# Patient Record
Sex: MALE | Race: WHITE | NOT HISPANIC OR LATINO | Employment: FULL TIME | URBAN - METROPOLITAN AREA
[De-identification: names, ages, dates, MRNs, and addresses within clinical notes are randomized per-mention and may not be internally consistent; named-entity substitution may affect disease eponyms.]

---

## 2023-04-03 ENCOUNTER — EVALUATION (OUTPATIENT)
Dept: PHYSICAL THERAPY | Facility: REHABILITATION | Age: 38
End: 2023-04-03

## 2023-04-03 DIAGNOSIS — G89.29 CHRONIC RIGHT-SIDED THORACIC BACK PAIN: Primary | ICD-10-CM

## 2023-04-03 DIAGNOSIS — M54.6 CHRONIC RIGHT-SIDED THORACIC BACK PAIN: Primary | ICD-10-CM

## 2023-04-03 NOTE — LETTER
April 3, 2023    JAYLEN Cantor  [id number] [facility] [address]    Patient: Margo Hendricks   YOB: 1985   Date of Visit: 4/3/2023     Encounter Diagnosis     ICD-10-CM    1  Chronic right-sided thoracic back pain  M54 6     G89 29           Dear Dr Kota Alexander: Thank you for your recent referral of Margo Hendricks  Please review the attached evaluation summary from Damian's recent visit  Please verify that you agree with the plan of care by signing the attached order  If you have any questions or concerns, please do not hesitate to call  I sincerely appreciate the opportunity to share in the care of one of your patients and hope to have another opportunity to work with you in the near future  Sincerely,    Etelvina Price, PT      Referring Provider:      I certify that I have read the below Plan of Care and certify the need for these services furnished under this plan of treatment while under my care  JAYLEN Cantor  [address] [place] [id number]  Via Fax: [phone]          PT Evaluation     Today's date: 4/3/2023  Patient name: Margo Hendricks  : 1985  MRN: [id number]  Referring provider: NICOLLE Tidwell  Dx:   Encounter Diagnosis     ICD-10-CM    1  Chronic right-sided thoracic back pain  M54 6     G89 29                    Assessment  Assessment details: Pt is a pleasant 30 y o  male presenting to outpatient physical therapy with sgs/sxs that appear consistent with referring diagnosis  Pt presents with pain, decreased range of motion, decreased strength, and decreased tolerance to activity  Pt is a good candidate for outpatient physical therapy and would benefit from skilled physical therapy to address limitations and achieve goals, ensure safe return to work   Thank you for this referral    Impairments: abnormal coordination, abnormal or restricted ROM, activity intolerance, impaired physical strength and pain with function  Understanding of Dx/Px/POC: good   Prognosis: good    Goals  ST  Patient will report 25% decrease in pain in 4 weeks  2  Patient will demonstrate 25% improvement in ROM in 4 weeks  3  Patient will demonstrate 1/2 grade improvement in strength in 4 weeks  LT  Patient will be able to perform IADLS (ie  Holding child) without restriction or pain by discharge  2  Patient will be independent in HEP by discharge  3  Patient will be able to return to work duties without restriction or pain by discharge  Plan  Patient would benefit from: PT eval and skilled PT  Planned modality interventions: cryotherapy and thermotherapy: hydrocollator packs  Planned therapy interventions: IADL retraining, body mechanics training, flexibility, functional ROM exercises, home exercise program, neuromuscular re-education, manual therapy, postural training, strengthening, stretching, therapeutic activities, therapeutic exercise and joint mobilization  Frequency: 2x week  Duration in visits: 12  Duration in weeks: 6  Treatment plan discussed with: patient      Subjective Evaluation    History of Present Illness  Mechanism of injury: Patient presents with c/o R-sided thoracic pain present for many years  (+) scoliosis with L convexity, recently measured at 31 degrees  Increased pain with lifting, prolonged unsupported UE work in front of body  Currently patient is OOW secondary to sxs, works at a [employer] which involves repetitive work/lifting  Some improvement in sxs since being OOW  (+) Intermittent N/T through R paraspinals  No radiation of sxs through trunk  Improved pain levels with previous chiropractic care, rest, anti-inflammatories  Patient carries child on R side, is RHD  Plan is to participate in PT to allow for future injection and/or MRI    Pain  Current pain rating: 3  At best pain rating: 3  At worst pain ratin  Quality: dull ache    Patient Goals  Patient goals for therapy: decreased pain        Objective     General Comments:      Cervical/Thoracic Comments  UE screen unremarkable, ROM grossly WFL without pain  Limited forward flexion, L/R lateral flexion, rotation   - (+) discomfort at end range   (+) R rib hump  (-) pain with passive spinal mobility testing, (+) hypomobility through spinal segments   - curve noted to be somewhat flexible passively  (+) TTP through R paraspinals   - Hypertonicity through R medial scapular border and thoracic paraspinals  Increased pelvic and thoracic rotation with contralateral hip extension in prone        Precautions: Chronicity of sxs, scoliosis w/ R convexity    Manuals                                                                 Neuro Re-Ed             Pball press down             Shld ext XS             Paloff press                                                                 Ther Ex             UBE standing             Seated lateral flexion st- to R HEP            Scap squeeze HEP            S/l trunk stretch w/ towel roll             Roll outs fwd and R                                                    Ther Activity             Suitcase carry             Wilson's Voxli             Gait Training                                       Modalities

## 2023-04-03 NOTE — PROGRESS NOTES
PT Evaluation     Today's date: 4/3/2023  Patient name: Whit Marie  : 1985  MRN: [id number]  Referring provider: NICOLLE Quinones  Dx:   Encounter Diagnosis     ICD-10-CM    1  Chronic right-sided thoracic back pain  M54 6     G89 29                    Assessment  Assessment details: Pt is a pleasant 30 y o  male presenting to outpatient physical therapy with sgs/sxs that appear consistent with referring diagnosis  Pt presents with pain, decreased range of motion, decreased strength, and decreased tolerance to activity  Pt is a good candidate for outpatient physical therapy and would benefit from skilled physical therapy to address limitations and achieve goals, ensure safe return to work  Thank you for this referral    Impairments: abnormal coordination, abnormal or restricted ROM, activity intolerance, impaired physical strength and pain with function  Understanding of Dx/Px/POC: good   Prognosis: good    Goals  ST  Patient will report 25% decrease in pain in 4 weeks  2  Patient will demonstrate 25% improvement in ROM in 4 weeks  3  Patient will demonstrate 1/2 grade improvement in strength in 4 weeks  LT  Patient will be able to perform IADLS (ie  Holding child) without restriction or pain by discharge  2  Patient will be independent in HEP by discharge  3  Patient will be able to return to work duties without restriction or pain by discharge        Plan  Patient would benefit from: PT eval and skilled PT  Planned modality interventions: cryotherapy and thermotherapy: hydrocollator packs  Planned therapy interventions: IADL retraining, body mechanics training, flexibility, functional ROM exercises, home exercise program, neuromuscular re-education, manual therapy, postural training, strengthening, stretching, therapeutic activities, therapeutic exercise and joint mobilization  Frequency: 2x week  Duration in visits: 12  Duration in weeks: 6  Treatment plan discussed with: patient      Subjective Evaluation    History of Present Illness  Mechanism of injury: Patient presents with c/o R-sided thoracic pain present for many years  (+) scoliosis with L convexity, recently measured at 31 degrees  Increased pain with lifting, prolonged unsupported UE work in front of body  Currently patient is OOW secondary to sxs, works at a [employer] which involves repetitive work/lifting  Some improvement in sxs since being OOW  (+) Intermittent N/T through R paraspinals  No radiation of sxs through trunk  Improved pain levels with previous chiropractic care, rest, anti-inflammatories  Patient carries child on R side, is RHD  Plan is to participate in PT to allow for future injection and/or MRI    Pain  Current pain rating: 3  At best pain rating: 3  At worst pain ratin  Quality: dull ache    Patient Goals  Patient goals for therapy: decreased pain        Objective     General Comments:      Cervical/Thoracic Comments  UE screen unremarkable, ROM grossly WFL without pain  Limited forward flexion, L/R lateral flexion, rotation   - (+) discomfort at end range   (+) R rib hump  (-) pain with passive spinal mobility testing, (+) hypomobility through spinal segments   - curve noted to be somewhat flexible passively  (+) TTP through R paraspinals   - Hypertonicity through R medial scapular border and thoracic paraspinals  Increased pelvic and thoracic rotation with contralateral hip extension in prone         Precautions: Chronicity of sxs, scoliosis w/ R convexity    Manuals                                                                 Neuro Re-Ed             Pball press down             Shld ext XS             Paloff press                                                                 Ther Ex             UBE standing             Seated lateral flexion st- to R HEP            Scap squeeze HEP            S/l trunk stretch w/ towel roll             Roll outs fwd and R Ther Activity             Suitcase carry             Wilson's carry             Gait Training                                       Modalities

## 2023-04-06 ENCOUNTER — OFFICE VISIT (OUTPATIENT)
Dept: PHYSICAL THERAPY | Facility: REHABILITATION | Age: 38
End: 2023-04-06

## 2023-04-06 DIAGNOSIS — M54.6 CHRONIC RIGHT-SIDED THORACIC BACK PAIN: Primary | ICD-10-CM

## 2023-04-06 DIAGNOSIS — G89.29 CHRONIC RIGHT-SIDED THORACIC BACK PAIN: Primary | ICD-10-CM

## 2023-04-06 NOTE — PROGRESS NOTES
"Daily Note     Today's date: 2023  Patient name: Mark Hoyt  : 1985  MRN: [id number]  Referring provider: NICOLLE Parker  Dx:   Encounter Diagnosis     ICD-10-CM    1  Chronic right-sided thoracic back pain  M54 6     G89 29                      Subjective: Pt reports he has been doing well with no significant change in symptoms since his IE  Does have some questions about his HEP and would like to review these interventions  Objective: See treatment diary below      Assessment: Tolerated treatment well  Initiated POC as outlined below, focusing on L thoracic mobility and scapular strength/stability  Slight discomfort underneath R scapula during UBE and burning along R scapula during L facing pallof press  Reviewed HEP and answered questions/corrected form; UT dominance during scap squeeze and going to L instead of R during lat flex stretch  Patient would benefit from continued PT to further improve strength, increase mobility, and maximize overall strength with reduced frequency of music  Plan: Continue per plan of care  Monitor response to initial treatment NV         Precautions: Chronicity of sxs, scoliosis w/ R convexity    Manuals                                                                 Neuro Re-Ed             Pball press down  5\"x10           Shld ext XS  purp  2x10           Paloff press  OJB  2x10 ea           scap squeeze  5\"x20                                                  Ther Ex             UBE standing  3'/3'           Seated lateral flexion st- to R HEP 10\"x5           Scap squeeze HEP            S/l trunk stretch w/ towel roll             Roll outs fwd and R  10\"x5 ea                                                  Ther Activity             Suitcase carry             Wilson's carry  10#/8#  4 laps           Gait Training                                       Modalities                                            "

## 2023-04-10 ENCOUNTER — APPOINTMENT (OUTPATIENT)
Dept: PHYSICAL THERAPY | Facility: REHABILITATION | Age: 38
End: 2023-04-10

## 2023-04-28 ENCOUNTER — OFFICE VISIT (OUTPATIENT)
Dept: PHYSICAL THERAPY | Facility: REHABILITATION | Age: 38
End: 2023-04-28

## 2023-04-28 DIAGNOSIS — M54.6 CHRONIC RIGHT-SIDED THORACIC BACK PAIN: Primary | ICD-10-CM

## 2023-04-28 DIAGNOSIS — G89.29 CHRONIC RIGHT-SIDED THORACIC BACK PAIN: Primary | ICD-10-CM

## 2023-04-28 NOTE — PROGRESS NOTES
"Daily Note     Today's date: 2023  Patient name: Keiry Peterson  : 1985  MRN: [id number]  Referring provider: NICOLLE Oliveira  Dx:   Encounter Diagnosis     ICD-10-CM    1  Chronic right-sided thoracic back pain  M54 6     G89 29           Start Time: 835  Stop Time: 09  Total time in clinic (min): 36 minutes    Subjective: Patient reports he was sore after last treatment  Reports a lot of soreness around his back  Soreness just resolved yesterday  Objective: See treatment diary below  Assessment: Tolerated treatment well  Performed less mobilizations today due to pt soreness after last treatment  Patient would benefit from continued PT to further improve strength, increase mobility, and maximize overall function  Plan: Continue per plan of care        Precautions: Chronicity of sxs, scoliosis w/ R convexity    Manuals          L QL STM   AFB          Thoracic mobs    QD T/S R side PA QD T/S R side PA                     Assessment    QD 5' QD 3'        Neuro Re-Ed            Pball press down  5\"x10 np          Shld ext XS  purp  2x10 purp  2x10 purp 3x8 I's to T's maddi LPD 3x8 18lbs        Paloff press  OJB  2x10 ea OJB  2x10 ea          scap squeeze  5\"x20 5\"x20          maddi standing rotations    15lbs 25x ea 15lbs 25x ea        Wall slides    3x6 YMB 3x8 YMB                     Ther Ex            UBE standing  3'/3' 3'/3' 3'/3' 4'/4'        Seated lateral flexion st- to R HEP 10\"x5 10\"x5          Scap squeeze HEP            S/l trunk stretch w/ towel roll   On small blue bolster   10\"x10          Roll outs fwd and R  10\"x5 ea 10\"x5 ea          Supine shld flex with dowel    1/2 foam under T/S 4x10 1/2 foam under T/S 3x15                                  Ther Activity             Suitcase carry   L hand only  10# KB  1 lap    w YJB  2 laps          Farmer's carry  10#/8#  4 laps           Gait Training                              " Modalities

## 2023-05-05 ENCOUNTER — APPOINTMENT (OUTPATIENT)
Dept: PHYSICAL THERAPY | Facility: REHABILITATION | Age: 38
End: 2023-05-05
Payer: COMMERCIAL

## 2023-05-12 ENCOUNTER — OFFICE VISIT (OUTPATIENT)
Dept: PHYSICAL THERAPY | Facility: REHABILITATION | Age: 38
End: 2023-05-12

## 2023-05-12 DIAGNOSIS — M54.6 CHRONIC RIGHT-SIDED THORACIC BACK PAIN: Primary | ICD-10-CM

## 2023-05-12 DIAGNOSIS — G89.29 CHRONIC RIGHT-SIDED THORACIC BACK PAIN: Primary | ICD-10-CM

## 2023-05-12 NOTE — PROGRESS NOTES
"Daily Note     Today's date: 2023  Patient name: Savita Blount  : 1985  MRN: [id number]  Referring provider: NICOLLE Amador  Dx:   Encounter Diagnosis     ICD-10-CM    1  Chronic right-sided thoracic back pain  M54 6     G89 29           Start Time: 08  Stop Time: 0915  Total time in clinic (min): 33 minutes    Subjective: Patient reports he was sore after last treatment  Reports a lot of soreness around his back  Soreness just resolved yesterday  Objective: See treatment diary below  Assessment: Tolerated treatment well  Performed less mobilizations today due to pt soreness after last treatment  Patient would benefit from continued PT to further improve strength, increase mobility, and maximize overall function  Plan: Continue per plan of care        Precautions: Chronicity of sxs, scoliosis w/ R convexity    Manuals         L QL STM   AFB          Thoracic mobs    QD T/S R side PA QD T/S R side PA QD T/S R side PA                    Assessment    QD 5' QD 3' QD 4'       Neuro Re-Ed           Pball press down  5\"x10 np          Shld ext XS  purp  2x10 purp  2x10 purp 3x8 I's to T's maddi LPD 3x8 18lbs maddi LPD 3x8 18lbs       Paloff press  OJB  2x10 ea OJB  2x10 ea          scap squeeze  5\"x20 5\"x20          maddi standing rotations    15lbs 25x ea 15lbs 25x ea 15lbs 25x ea       Wall slides    3x6 YMB 3x8 YMB 3x6 YMB                    Ther Ex           UBE standing  3'/3' 3'/3' 3'/3' 4'/4' 4'/4'       Seated lateral flexion st- to R HEP 10\"x5 10\"x5          Scap squeeze HEP            S/l trunk stretch w/ towel roll   On small blue bolster   10\"x10          Roll outs fwd and R  10\"x5 ea 10\"x5 ea          Supine shld flex with dowel    1/2 foam under T/S 4x10 1/2 foam under T/S 3x15 1/2 foam under T/S 3x15                                 Ther Activity             Suitcase carry   L hand only  10# KB  1 lap    w " YJB  2 laps          Farmer's carry  10#/8#  4 laps           Gait Training                                       Modalities

## 2023-05-19 ENCOUNTER — OFFICE VISIT (OUTPATIENT)
Dept: PHYSICAL THERAPY | Facility: REHABILITATION | Age: 38
End: 2023-05-19

## 2023-05-19 DIAGNOSIS — G89.29 CHRONIC RIGHT-SIDED THORACIC BACK PAIN: Primary | ICD-10-CM

## 2023-05-19 DIAGNOSIS — M54.6 CHRONIC RIGHT-SIDED THORACIC BACK PAIN: Primary | ICD-10-CM

## 2023-05-19 NOTE — PROGRESS NOTES
"Daily Note     Today's date: 2023  Patient name: Dexter Tang  : 1985  MRN: [id number]  Referring provider: NICOLLE Dill  Dx:   Encounter Diagnosis     ICD-10-CM    1  Chronic right-sided thoracic back pain  M54 6     G89 29           Start Time: 0855  Stop Time: 09  Total time in clinic (min): 35 minutes    Subjective: Patient reports his back is the same  Max pain level a 5/10  Objective: See treatment diary below  Assessment: Tolerated treatment well  D/C today due to lack of improvement  Went over HEP to keep up with mobility exercises  Goals  ST  Patient will report 25% decrease in pain in 4 weeks  NOT MET  2  Patient will demonstrate 25% improvement in ROM in 4 weeks  MET  3  Patient will demonstrate 1/2 grade improvement in strength in 4 weeks  MET    LT  Patient will be able to perform IADLS (ie  Holding child) without restriction or pain by discharge  MET  2  Patient will be independent in HEP by discharge  MET  3  Patient will be able to return to work duties without restriction or pain by discharge  MET    Plan: Continue per plan of care        Precautions: Chronicity of sxs, scoliosis w/ R convexity    Manuals        L QL STM   AFB          Thoracic mobs    QD T/S R side PA QD T/S R side PA QD T/S R side PA QD T/S R side PA                   Assessment    QD 5' QD 3' QD 4' QD 3'      Neuro Re-Ed          Pball press down  5\"x10 np          Shld ext XS  purp  2x10 purp  2x10 purp 3x8 I's to T's maddi LPD 3x8 18lbs maddi LPD 3x8 18lbs LPD 3x8 maddi 18lbs      Paloff press  OJB  2x10 ea OJB  2x10 ea          scap squeeze  5\"x20 5\"x20          maddi standing rotations    15lbs 25x ea 15lbs 25x ea 15lbs 25x ea 20x 15lbs      Wall slides    3x6 YMB 3x8 YMB 3x6 YMB                    Ther Ex          UBE standing  3'/3' 3'/3' 3'/3' 4'/4' 4'/4' 4'/4'      Seated lateral flexion st- to R HEP " "10\"x5 10\"x5          Scap squeeze HEP            S/l trunk stretch w/ towel roll   On small blue bolster   10\"x10          Roll outs fwd and R  10\"x5 ea 10\"x5 ea          Supine shld flex with dowel    1/2 foam under T/S 4x10 1/2 foam under T/S 3x15 1/2 foam under T/S 3x15 1/2 foam under T/S 5'                   HEP       QD 4'      Ther Activity    4/21         Suitcase carry   L hand only  10# KB  1 lap    w YJB  2 laps          Farmer's carry  10#/8#  4 laps           Gait Training                                       Modalities                                            "